# Patient Record
Sex: MALE | Race: WHITE | Employment: OTHER | URBAN - METROPOLITAN AREA
[De-identification: names, ages, dates, MRNs, and addresses within clinical notes are randomized per-mention and may not be internally consistent; named-entity substitution may affect disease eponyms.]

---

## 2017-01-09 ENCOUNTER — ANTI-COAG VISIT (OUTPATIENT)
Dept: PHARMACY | Facility: CLINIC | Age: 82
End: 2017-01-09

## 2017-01-09 DIAGNOSIS — I48.91 ATRIAL FIBRILLATION, UNSPECIFIED TYPE (HCC): ICD-10-CM

## 2017-01-09 LAB — INR BLD: 2.7

## 2017-02-06 ENCOUNTER — ANTI-COAG VISIT (OUTPATIENT)
Dept: PHARMACY | Facility: CLINIC | Age: 82
End: 2017-02-06

## 2017-02-06 DIAGNOSIS — I48.91 ATRIAL FIBRILLATION, UNSPECIFIED TYPE (HCC): ICD-10-CM

## 2017-02-06 LAB — INR BLD: 2.5

## 2017-03-06 ENCOUNTER — ANTI-COAG VISIT (OUTPATIENT)
Dept: PHARMACY | Facility: CLINIC | Age: 82
End: 2017-03-06

## 2017-03-06 DIAGNOSIS — I48.91 ATRIAL FIBRILLATION, UNSPECIFIED TYPE (HCC): ICD-10-CM

## 2017-03-06 LAB — INR BLD: 1.8

## 2017-04-03 ENCOUNTER — ANTI-COAG VISIT (OUTPATIENT)
Dept: PHARMACY | Facility: CLINIC | Age: 82
End: 2017-04-03

## 2017-04-03 DIAGNOSIS — I48.91 ATRIAL FIBRILLATION, UNSPECIFIED TYPE (HCC): ICD-10-CM

## 2017-04-03 LAB — INR BLD: 2.9

## 2017-05-01 ENCOUNTER — ANTI-COAG VISIT (OUTPATIENT)
Dept: PHARMACY | Facility: CLINIC | Age: 82
End: 2017-05-01

## 2017-05-01 DIAGNOSIS — I48.91 ATRIAL FIBRILLATION, UNSPECIFIED TYPE (HCC): ICD-10-CM

## 2017-05-01 LAB — INR BLD: 3.3

## 2017-05-22 ENCOUNTER — ANTI-COAG VISIT (OUTPATIENT)
Dept: PHARMACY | Facility: CLINIC | Age: 82
End: 2017-05-22

## 2017-05-22 DIAGNOSIS — I48.91 ATRIAL FIBRILLATION, UNSPECIFIED TYPE (HCC): ICD-10-CM

## 2017-05-22 LAB — INR BLD: 3

## 2017-06-19 ENCOUNTER — ANTI-COAG VISIT (OUTPATIENT)
Dept: PHARMACY | Facility: CLINIC | Age: 82
End: 2017-06-19

## 2017-06-19 DIAGNOSIS — I48.91 ATRIAL FIBRILLATION, UNSPECIFIED TYPE (HCC): ICD-10-CM

## 2017-06-19 LAB — INR BLD: 3

## 2017-07-17 ENCOUNTER — ANTI-COAG VISIT (OUTPATIENT)
Dept: PHARMACY | Facility: CLINIC | Age: 82
End: 2017-07-17

## 2017-07-17 DIAGNOSIS — I48.91 ATRIAL FIBRILLATION, UNSPECIFIED TYPE (HCC): ICD-10-CM

## 2017-07-17 LAB — INR BLD: 2.3

## 2017-08-14 ENCOUNTER — ANTI-COAG VISIT (OUTPATIENT)
Dept: PHARMACY | Facility: CLINIC | Age: 82
End: 2017-08-14

## 2017-08-14 DIAGNOSIS — I48.91 ATRIAL FIBRILLATION, UNSPECIFIED TYPE (HCC): ICD-10-CM

## 2017-08-14 LAB — INR BLD: 3.1

## 2017-09-18 ENCOUNTER — ANTI-COAG VISIT (OUTPATIENT)
Dept: PHARMACY | Facility: CLINIC | Age: 82
End: 2017-09-18

## 2017-09-18 DIAGNOSIS — I48.91 ATRIAL FIBRILLATION, UNSPECIFIED TYPE (HCC): ICD-10-CM

## 2017-09-18 LAB — INR BLD: 1.2

## 2017-10-02 ENCOUNTER — ANTI-COAG VISIT (OUTPATIENT)
Dept: PHARMACY | Facility: CLINIC | Age: 82
End: 2017-10-02

## 2017-10-02 DIAGNOSIS — I48.91 ATRIAL FIBRILLATION, UNSPECIFIED TYPE (HCC): ICD-10-CM

## 2017-10-02 LAB — INR BLD: 3.1

## 2017-10-02 NOTE — MR AVS SNAPSHOT
After Visit Summary             Dane Kelly   10/2/2017 10:30 AM   Anti-coag visit    Description:  Male : 1935   Provider:  [facility]   Department:  [facility]ICIANS [facility]              Your Follow-Up and Future Appointments         Below is a list of your follow-up and future appointments. This may not be a complete list as you may have made appointments directly with providers that we are not aware of or your providers may have made some for you. Please call your providers to confirm appointments. It is important to keep your appointments. Please bring your current insurance card, photo ID, co-pay, and all medication bottles to your appointment. If self-pay, payment is expected at the time of service. Your To-Do List     Future Appointments Provider Department Dept Phone    10/30/2017 10:00 AM [address] [place] [address] [facility] [phone]         Information from Your Visit        Department     Name Address Phone Fax    [facility] [address] [phone]      Anticoagulation Summary as of 10/2/2017              Today's INR 3. 1! Next INR check 10/30/2017      Description             Then Continue Warfarin 5 mg (one tablet) daily EXCEPT 7.5mg (one and a half tablets) every Monday. INR goal is 2-3    Call with medications changes, especially antibiotics and steroids and including any over-the-counter medications or herbal products. Call if you stop any medications.         Vital Signs     Smoking Status                   Current Every Day Smoker              Medications and Orders      Your Current Medications Are              warfarin (COUMADIN) 5 MG tablet Take 5 mg by mouth daily EXCEPT 7.5mg every Monday or as directed by [facility] Coumadin [facility] [phone] acetaminophen 650 MG TABS Take 650 mg by mouth every 4 hours as needed    metoprolol (LOPRESSOR) 50 MG tablet Take 0.5 tablets by mouth 2 times daily    lisinopril-hydrochlorothiazide (PRINZIDE;ZESTORETIC) 20-25 MG per tablet Take 1 tablet by mouth daily. fenofibrate micronized (LOFIBRA) 134 MG capsule Take 134 mg by mouth nightly. terazosin (HYTRIN) 5 MG capsule Take 5 mg by mouth nightly. pravastatin (PRAVACHOL) 40 MG tablet Take 40 mg by mouth nightly. vitamin B-12 (CYANOCOBALAMIN) 100 MCG tablet Take 100 mcg by mouth 2 times daily. Allergies              Pcn [Penicillins]       We Ordered/Performed the following           Protime-INR     Comments: This external order was created through the results console. Result Summary for Protime-INR      Result Information     Status          Final result (Resulted: 10/2/2017 10:36 AM)           10/2/2017 10:36 AM      Component Results     Component Value    INR 3.1               Additional Information        Basic Information     Date Of Birth Sex Race Ethnicity Preferred Language    1935 Male White Non-/Non  English      Problem List as of 10/2/2017                 Acute renal failure (ARF) (HCC)    Abdominal pain    Cholelithiasis    UTI (urinary tract infection)      Immunizations as of 10/2/2017     Name Date    Influenza Virus Vaccine  Deferred (Patient Refused)      Preventive Care        Date Due    Tetanus Combination Vaccine (1 - Tdap) 8/19/1955    Zoster Vaccine 8/19/1996    Pneumococcal Vaccines (two) for all adults aged 72 and over (1 of 2 - PCV13) 8/19/2001    Yearly Flu Vaccine (1) 9/1/2017            DotAlign Signup           DotAlign allows you to send messages to your doctor, view your test results, renew your prescriptions, schedule appointments, view visit notes, and more. How Do I Sign Up? 1. In your Internet browser, go to [email or web address] 2. Click on the Sign Up Now link in the Sign In box. You will see the New Member Sign Up page. 3. Enter your EARTHTORY Access Code exactly as it appears below. You will not need to use this code after youve completed the sign-up process. If you do not sign up before the expiration date, you must request a new code. EARTHTORY Access Code: [email or web address]  Expires: 10/13/2017  9:42 AM    4. Enter your Social Security Number ([id number]) and Date of Birth (mm/dd/yyyy) as indicated and click Submit. You will be taken to the next sign-up page. 5. Create a EARTHTORY ID. This will be your EARTHTORY login ID and cannot be changed, so think of one that is secure and easy to remember. 6. Create a EARTHTORY password. You can change your password at any time. 7. Enter your Password Reset Question and Answer. This can be used at a later time if you forget your password. 8. Enter your e-mail address. You will receive e-mail notification when new information is available in [address] 9. Click Sign Up. You can now view your medical record. Additional Information  If you have questions, please contact the physician practice where you receive care. Remember, EARTHTORY is NOT to be used for urgent needs. For medical emergencies, dial 911. For questions regarding your EARTHTORY account call [phone]. If you have a clinical question, please call your doctor's office.         October 2017 Details    Sun Mon Tue Wed Thu Fri Sat     1               2      7.5 mg   See details      3      5 mg         4      5 mg         5      5 mg         6      5 mg         7      5 mg           8      5 mg         9      7.5 mg         10      5 mg         11      5 mg         12      5 mg         13      5 mg         14      5 mg           15      5 mg         16      7.5 mg         17      5 mg         18      5 mg         19      5 mg         20      5 mg         21      5 mg           22      5 mg         23      7.5 mg         24      5 mg 25      5 mg         26      5 mg         27      5 mg         28      5 mg           29      5 mg         30      7.5 mg         31                    Date Details   10/02 This INR check       Date of next INR:  10/30/2017         How to take your warfarin dose              To take:  5 mg Take 1 of the 5 mg tablets. To take:  7.5 mg Take 1.5 of the 5 mg tablets.

## 2017-10-30 ENCOUNTER — ANTI-COAG VISIT (OUTPATIENT)
Dept: PHARMACY | Facility: CLINIC | Age: 82
End: 2017-10-30

## 2017-10-30 DIAGNOSIS — I48.91 ATRIAL FIBRILLATION, UNSPECIFIED TYPE (HCC): ICD-10-CM

## 2017-10-30 LAB — INR BLD: 3.4

## 2017-10-30 NOTE — PROGRESS NOTES
Mr. Bel Rosenbaum is a 81 y.o.  male with history of Afib who presents today for anticoagulation monitoring and adjustment. Patient verifies current dosing regimen. Patient denies s/s bleeding/bruising/swelling/SOB. No blood in urine or stool. No dietary changes. No changes in medication/OTC agents/Herbals. No change in alcohol use. No missed doses. No Procedures scheduled in the future at this time. Lab Results   Component Value Date    INR 3.4 10/30/2017    INR 3.1 10/02/2017    INR 1.2 09/18/2017           Coumadin dose: Hold warfarin today then  NEW DOSE: Warfarin 5 mg (one tablet) daily . Daniel Marin Recheck INR in 4 weeks. Patient reminded to call the Anticoagulation Clinic with any signs or symptoms of bleeding or with any medication changes. Patient given instructions utilizing the teach back method. After visit summary printed and reviewed with patient.       Medications reviewed and updated on home medication list Yes  Warfarin dose updated on patient home medication list  Yes    Influenza vaccine:   [] given    [] declined   [] received previously   [] plans to receive at a later time   [x] refused    [] documented in EPIC

## 2017-11-01 ENCOUNTER — HOSPITAL ENCOUNTER (OUTPATIENT)
Dept: OTHER | Age: 82
Discharge: HOME | End: 2017-11-30
Attending: INTERNAL MEDICINE | Admitting: INTERNAL MEDICINE

## 2017-12-01 ENCOUNTER — HOSPITAL ENCOUNTER (OUTPATIENT)
Dept: OTHER | Age: 82
Discharge: HOME | End: 2017-12-31
Attending: INTERNAL MEDICINE | Admitting: INTERNAL MEDICINE

## 2017-12-04 ENCOUNTER — ANTI-COAG VISIT (OUTPATIENT)
Dept: PHARMACY | Facility: CLINIC | Age: 82
End: 2017-12-04

## 2017-12-04 DIAGNOSIS — I48.91 ATRIAL FIBRILLATION, UNSPECIFIED TYPE (HCC): ICD-10-CM

## 2017-12-04 LAB — INTERNATIONAL NORMALIZATION RATIO, POC: 2

## 2017-12-04 NOTE — PROGRESS NOTES
Mr. Nikko Copeland is a 81 y.o.  male with history of Afib who presents today for anticoagulation monitoring and adjustment. Patient verifies current dosing regimen. Patient denies s/s bleeding/bruising/swelling/SOB. No blood in urine or stool. No dietary changes. No changes in medication/OTC agents/Herbals. No change in alcohol use. No missed doses. No Procedures scheduled in the future at this time. Lab Results   Component Value Date    INR 2 12/04/2017    INR 3.4 10/30/2017    INR 3.1 10/02/2017           Coumadin dose: Continue  Warfarin 5 mg (one tablet) daily . Vania Neil Recheck INR in 4 weeks. Patient reminded to call the Anticoagulation Clinic with any signs or symptoms of bleeding or with any medication changes. Patient given instructions utilizing the teach back method. After visit summary printed and reviewed with patient.       Medications reviewed and updated on home medication list Yes  Warfarin dose updated on patient home medication list  Yes    Influenza vaccine:   [] given    [] declined   [] received previously   [] plans to receive at a later time   [] refused    [] documented in EPIC

## 2018-01-01 ENCOUNTER — HOSPITAL ENCOUNTER (OUTPATIENT)
Dept: OTHER | Age: 83
Discharge: HOME | End: 2018-01-31
Attending: INTERNAL MEDICINE | Admitting: INTERNAL MEDICINE

## 2018-01-08 ENCOUNTER — ANTI-COAG VISIT (OUTPATIENT)
Dept: PHARMACY | Facility: CLINIC | Age: 83
End: 2018-01-08

## 2018-01-08 DIAGNOSIS — I48.91 ATRIAL FIBRILLATION, UNSPECIFIED TYPE (HCC): ICD-10-CM

## 2018-01-08 LAB — INTERNATIONAL NORMALIZATION RATIO, POC: 3

## 2018-01-08 NOTE — PROGRESS NOTES
Mr. Tasha Peters is a 81 y.o.  male with history of Afib who presents today for anticoagulation monitoring and adjustment. Patient verifies current dosing regimen  Patient denies s/s bleeding/bruising/swelling/SOB  No blood in urine or stool. No dietary changes. No changes in medication/OTC agents/Herbals. No change in alcohol use. No missed doses. No Procedures scheduled in the future at this time. Lab Results   Component Value Date    INR 3.0 01/08/2018    INR 2 12/04/2017    INR 3.4 10/30/2017       Patient was still taking 7.5mg on Mondays over the past four weeks instead of doing just 5mg daily. Since his INR has been historically high on that dose and it's on the higher end of his goal range of 2.0-3.0 today, instructed patient to just do 5mg daily. Coumadin dose: Continue Warfarin 5 mg (one tablet) daily. Will return in 4 weeks. After visit summary printed and reviewed with patient.       Medications reviewed and updated on home medication list: No: patient stated no changes  Warfarin dose updated on patient home medication list: Yes     Influenza vaccine:    [] given    [x] declined   [] received previously   [] plans to receive at a later time   [x] refused     [] documented in EPIC

## 2018-02-01 ENCOUNTER — HOSPITAL ENCOUNTER (OUTPATIENT)
Dept: OTHER | Age: 83
Discharge: HOME | End: 2018-02-28
Attending: INTERNAL MEDICINE | Admitting: INTERNAL MEDICINE

## 2018-02-05 ENCOUNTER — ANTI-COAG VISIT (OUTPATIENT)
Dept: PHARMACY | Facility: CLINIC | Age: 83
End: 2018-02-05

## 2018-02-05 DIAGNOSIS — I48.91 ATRIAL FIBRILLATION, UNSPECIFIED TYPE (HCC): ICD-10-CM

## 2018-02-05 LAB — INTERNATIONAL NORMALIZATION RATIO, POC: 3.3

## 2018-03-01 ENCOUNTER — HOSPITAL ENCOUNTER (OUTPATIENT)
Dept: OTHER | Age: 83
Discharge: HOME | End: 2018-03-31
Attending: INTERNAL MEDICINE | Admitting: INTERNAL MEDICINE

## 2018-03-06 ENCOUNTER — ANTI-COAG VISIT (OUTPATIENT)
Dept: PHARMACY | Facility: CLINIC | Age: 83
End: 2018-03-06

## 2018-03-06 DIAGNOSIS — I48.91 ATRIAL FIBRILLATION, UNSPECIFIED TYPE (HCC): ICD-10-CM

## 2018-03-06 LAB — INTERNATIONAL NORMALIZATION RATIO, POC: 1.9

## 2018-04-01 ENCOUNTER — HOSPITAL ENCOUNTER (OUTPATIENT)
Dept: OTHER | Age: 83
Discharge: HOME | End: 2018-04-30
Attending: INTERNAL MEDICINE | Admitting: INTERNAL MEDICINE

## 2018-04-11 ENCOUNTER — ANTI-COAG VISIT (OUTPATIENT)
Dept: PHARMACY | Facility: CLINIC | Age: 83
End: 2018-04-11

## 2018-04-11 LAB — INTERNATIONAL NORMALIZATION RATIO, POC: 3.3

## 2018-05-01 ENCOUNTER — HOSPITAL ENCOUNTER (OUTPATIENT)
Dept: OTHER | Age: 83
Discharge: HOME | End: 2018-05-31
Attending: INTERNAL MEDICINE | Admitting: INTERNAL MEDICINE

## 2018-05-07 ENCOUNTER — ANTI-COAG VISIT (OUTPATIENT)
Dept: PHARMACY | Facility: CLINIC | Age: 83
End: 2018-05-07

## 2018-05-07 LAB — INTERNATIONAL NORMALIZATION RATIO, POC: 3.2

## 2018-05-21 ENCOUNTER — ANTI-COAG VISIT (OUTPATIENT)
Dept: PHARMACY | Facility: CLINIC | Age: 83
End: 2018-05-21

## 2018-05-21 DIAGNOSIS — I48.91 ATRIAL FIBRILLATION, UNSPECIFIED TYPE (HCC): ICD-10-CM

## 2018-05-21 LAB — INR BLD: 2.1

## 2018-06-01 ENCOUNTER — HOSPITAL ENCOUNTER (OUTPATIENT)
Dept: OTHER | Age: 83
Discharge: HOME | End: 2018-06-30
Attending: INTERNAL MEDICINE | Admitting: INTERNAL MEDICINE

## 2018-07-01 ENCOUNTER — HOSPITAL ENCOUNTER (OUTPATIENT)
Dept: OTHER | Age: 83
Discharge: HOME | End: 2018-07-31
Attending: INTERNAL MEDICINE | Admitting: INTERNAL MEDICINE

## 2018-07-20 ENCOUNTER — TELEPHONE (OUTPATIENT)
Dept: PHARMACY | Facility: CLINIC | Age: 83
End: 2018-07-20

## 2018-07-20 ENCOUNTER — ANTI-COAG VISIT (OUTPATIENT)
Dept: PHARMACY | Facility: CLINIC | Age: 83
End: 2018-07-20

## 2018-07-20 DIAGNOSIS — I48.91 ATRIAL FIBRILLATION, UNSPECIFIED TYPE (HCC): ICD-10-CM

## 2018-07-20 LAB — INR BLD: 2.6

## 2018-08-01 ENCOUNTER — HOSPITAL ENCOUNTER (OUTPATIENT)
Dept: OTHER | Age: 83
Discharge: HOME | End: 2018-08-31
Attending: INTERNAL MEDICINE | Admitting: INTERNAL MEDICINE

## 2018-08-20 ENCOUNTER — ANTI-COAG VISIT (OUTPATIENT)
Dept: PHARMACY | Facility: CLINIC | Age: 83
End: 2018-08-20

## 2018-08-20 DIAGNOSIS — I48.91 ATRIAL FIBRILLATION, UNSPECIFIED TYPE (HCC): ICD-10-CM

## 2018-08-20 LAB — INR BLD: 4.7

## 2018-08-20 NOTE — PROGRESS NOTES
Mr. Yury Gonzalez is a 81 y.o.  male with history of Afib who presents today for anticoagulation monitoring and adjustment. Patient verifies current dosing regimen  Patient denies s/s bleeding/bruising/swelling/SOB  No blood in urine or stool. No changes in medication/OTC agents/Herbals. No change in alcohol use. No missed doses. No Procedures scheduled in the future at this time. Lab Results   Component Value Date    INR 4.70 08/20/2018    INR 2.60 07/20/2018    INR 2.10 05/21/2018       Pertinent findings: Patient appears well. No changes in medications. States that he's been drinking cranberry juice and grapefruit juice for the past week, which is most likely contributing to the supratherapeutic INR. He agreed to start avoiding these juices from now on. Will continue current dose and see him in 2 weeks to see if INR has adjusted back down to therapeutic range. Warfarin dosing: HOLD warfarin today and tomorrow  Then continue warfarin 5 mg daily except 2.5 mg on Mondays    After visit summary printed and reviewed with patient.

## 2018-09-01 ENCOUNTER — HOSPITAL ENCOUNTER (OUTPATIENT)
Dept: OTHER | Age: 83
Discharge: HOME | End: 2018-09-01
Attending: INTERNAL MEDICINE | Admitting: INTERNAL MEDICINE

## 2018-09-04 ENCOUNTER — ANTI-COAG VISIT (OUTPATIENT)
Dept: PHARMACY | Facility: CLINIC | Age: 83
End: 2018-09-04

## 2018-09-04 DIAGNOSIS — I48.91 ATRIAL FIBRILLATION, UNSPECIFIED TYPE (HCC): ICD-10-CM

## 2018-09-04 LAB — INTERNATIONAL NORMALIZATION RATIO, POC: 3.4

## 2018-09-17 ENCOUNTER — ANTI-COAG VISIT (OUTPATIENT)
Dept: PHARMACY | Facility: CLINIC | Age: 83
End: 2018-09-17

## 2018-09-17 DIAGNOSIS — I48.91 ATRIAL FIBRILLATION, UNSPECIFIED TYPE (HCC): ICD-10-CM

## 2018-09-17 LAB — INTERNATIONAL NORMALIZATION RATIO, POC: 2.1

## 2018-09-17 NOTE — PROGRESS NOTES
Mr. Ryan Du is a 81 y.o.  male with history of Afib who presents today for anticoagulation monitoring and adjustment. Patient verifies current dosing regimen. Lab Results   Component Value Date    INR 2.1 09/17/2018    INR 3.4 09/04/2018    INR 4.70 08/20/2018     Patient doing well. No problems or changes. Reduced his dose. INR now in goal range    Coumadin dose: continue same dose. Recheck INR in 3 weeks to verify INR stays in range    Patient reminded to call the Anticoagulation Clinic with any signs or symptoms of bleeding or with any medication changes. Patient given instructions utilizing the teach back method. After visit summary printed and reviewed with patient.     Medications reviewed and Warfarin dose updated

## 2018-10-08 ENCOUNTER — ANTI-COAG VISIT (OUTPATIENT)
Dept: PHARMACY | Age: 83
End: 2018-10-08
Payer: MEDICARE

## 2018-10-08 DIAGNOSIS — I48.91 ATRIAL FIBRILLATION, UNSPECIFIED TYPE (HCC): ICD-10-CM

## 2018-10-08 LAB — INTERNATIONAL NORMALIZATION RATIO, POC: 2.6

## 2018-10-08 PROCEDURE — 99211 OFF/OP EST MAY X REQ PHY/QHP: CPT

## 2018-10-08 PROCEDURE — 85610 PROTHROMBIN TIME: CPT

## 2018-11-08 ENCOUNTER — ANTI-COAG VISIT (OUTPATIENT)
Dept: PHARMACY | Age: 83
End: 2018-11-08
Payer: MEDICARE

## 2018-11-08 DIAGNOSIS — I48.0 PAROXYSMAL ATRIAL FIBRILLATION (HCC): ICD-10-CM

## 2018-11-08 LAB — INTERNATIONAL NORMALIZATION RATIO, POC: 3.3

## 2018-11-08 PROCEDURE — 85610 PROTHROMBIN TIME: CPT

## 2018-11-08 PROCEDURE — 99211 OFF/OP EST MAY X REQ PHY/QHP: CPT

## 2018-12-03 ENCOUNTER — ANTI-COAG VISIT (OUTPATIENT)
Dept: PHARMACY | Age: 83
End: 2018-12-03
Payer: MEDICARE

## 2018-12-03 LAB — INTERNATIONAL NORMALIZATION RATIO, POC: 2.5

## 2018-12-03 PROCEDURE — 99211 OFF/OP EST MAY X REQ PHY/QHP: CPT

## 2018-12-03 PROCEDURE — 85610 PROTHROMBIN TIME: CPT

## 2018-12-03 NOTE — PROGRESS NOTES
Mr. Silvio Bowens is a 81 y.o.  male with history of Afib who presents today for anticoagulation monitoring and adjustment. Patient verifies current dosing regimen  Patient denies s/s bleeding/bruising/swelling/SOB  No blood in urine or stool. No dietary changes. No changes in medication/OTC agents/Herbals. No change in alcohol use. No missed doses. No Procedures scheduled in the future at this time. Lab Results   Component Value Date    INR 2.5 12/03/2018    INR 3.3 11/08/2018    INR 2.6 10/08/2018       Pertinent findings:  Patient states doing well. No complaints at this time. Patient had a large salad yesterday with about 1/2 cup of broccoli in it. No change to warfarin weekly dosing of 5 mg daily except 2.5 mg on Mondays and fridays. Next INR in two weeks. After visit summary printed and reviewed with patient.       Medications reviewed and updated on home medication list: NO     Warfarin dose updated on patient home medication list: NO

## 2018-12-31 ENCOUNTER — ANTI-COAG VISIT (OUTPATIENT)
Dept: PHARMACY | Age: 83
End: 2018-12-31
Payer: MEDICARE

## 2018-12-31 LAB — INR BLD: 3.4

## 2018-12-31 PROCEDURE — 85610 PROTHROMBIN TIME: CPT

## 2018-12-31 PROCEDURE — 99211 OFF/OP EST MAY X REQ PHY/QHP: CPT

## 2019-01-28 ENCOUNTER — ANTI-COAG VISIT (OUTPATIENT)
Dept: PHARMACY | Age: 84
End: 2019-01-28
Payer: MEDICARE

## 2019-01-28 LAB — INTERNATIONAL NORMALIZATION RATIO, POC: 3.3

## 2019-01-28 PROCEDURE — 99211 OFF/OP EST MAY X REQ PHY/QHP: CPT

## 2019-01-28 PROCEDURE — 85610 PROTHROMBIN TIME: CPT

## 2019-02-18 ENCOUNTER — ANTI-COAG VISIT (OUTPATIENT)
Dept: PHARMACY | Age: 84
End: 2019-02-18
Payer: MEDICARE

## 2019-02-18 LAB — INTERNATIONAL NORMALIZATION RATIO, POC: 2.2

## 2019-02-18 PROCEDURE — 99211 OFF/OP EST MAY X REQ PHY/QHP: CPT

## 2019-02-18 PROCEDURE — 85610 PROTHROMBIN TIME: CPT

## 2019-03-18 ENCOUNTER — ANTI-COAG VISIT (OUTPATIENT)
Dept: PHARMACY | Age: 84
End: 2019-03-18
Payer: MEDICARE

## 2019-03-18 DIAGNOSIS — I48.91 ATRIAL FIBRILLATION, UNSPECIFIED TYPE (HCC): ICD-10-CM

## 2019-03-18 LAB — INR BLD: 2.4

## 2019-03-18 PROCEDURE — 99211 OFF/OP EST MAY X REQ PHY/QHP: CPT

## 2019-03-18 PROCEDURE — 85610 PROTHROMBIN TIME: CPT

## 2019-04-15 ENCOUNTER — ANTI-COAG VISIT (OUTPATIENT)
Dept: PHARMACY | Age: 84
End: 2019-04-15
Payer: MEDICARE

## 2019-04-15 DIAGNOSIS — I48.91 ATRIAL FIBRILLATION, UNSPECIFIED TYPE (HCC): ICD-10-CM

## 2019-04-15 LAB — INTERNATIONAL NORMALIZATION RATIO, POC: 1.2

## 2019-04-15 PROCEDURE — 85610 PROTHROMBIN TIME: CPT

## 2019-04-15 PROCEDURE — 99211 OFF/OP EST MAY X REQ PHY/QHP: CPT

## 2019-04-15 NOTE — PROGRESS NOTES
Mr. Faith Woods is a 81 y.o.  male with history of Afib who presents today for anticoagulation monitoring and adjustment. Patient verifies current dosing regimen. Lab Results   Component Value Date    INR 1.2 04/15/2019    INR 2.4 03/18/2019    INR 2.2 02/18/2019     Patient doing well. Reports he has been drinking a lot of V8 juice recently the past 1-2 weeks. About 4 12 ounce bottles per day. States he really likes it and thought it was good for him. Counseled on vitamin K content that reverses the effect of warfarin. He would like to keep drinking it - asked him to cut back to 2, maybe 3 on occasion bottles per day and he is agreeable. Will have to increase dose to account for this increase in vitamin K     Counseled on need to be consistent. Coumadin dose: 7.5 mg x 1 then 5 mg daily except 2.5 mg every Friday (~18% increase). Recheck INR in 1 week(s). Patient reminded to call the Anticoagulation Clinic with any signs or symptoms of bleeding or with any medication changes. Patient given instructions utilizing the teach back method. After visit summary printed and reviewed with patient.     Medications reviewed and Warfarin dose updated

## 2019-04-22 ENCOUNTER — ANTI-COAG VISIT (OUTPATIENT)
Dept: PHARMACY | Age: 84
End: 2019-04-22
Payer: MEDICARE

## 2019-04-22 DIAGNOSIS — I48.91 ATRIAL FIBRILLATION, UNSPECIFIED TYPE (HCC): ICD-10-CM

## 2019-04-22 LAB — INTERNATIONAL NORMALIZATION RATIO, POC: 1.8

## 2019-04-22 PROCEDURE — 99211 OFF/OP EST MAY X REQ PHY/QHP: CPT

## 2019-04-22 PROCEDURE — 85610 PROTHROMBIN TIME: CPT

## 2019-04-22 NOTE — PROGRESS NOTES
Mr. Elma Aranda is a 81 y.o.  male with history of Afib who presents today for anticoagulation monitoring and adjustment. Patient verifies current dosing regimen  Patient denies s/s bleeding/bruising/swelling/SOB  No blood in urine or stool. No dietary changes. No changes in medication/OTC agents/Herbals. No change in alcohol use. No missed doses. No Procedures scheduled in the future at this time. Lab Results   Component Value Date    INR 1.8 04/22/2019    INR 1.2 04/15/2019    INR 2.4 03/18/2019       Pertinent findings: not drinking as much V8    Warfarin dosing: Continue  Warfarin 5 mg daily except 2.5 mg every Friday. INR goal is 2-3  See in 4 weeks    After visit summary printed and reviewed with patient.       Medications reviewed and updated on home medication list: Yes  Warfarin dose updated on patient home medication list: Yes

## 2019-05-20 ENCOUNTER — ANTI-COAG VISIT (OUTPATIENT)
Dept: PHARMACY | Age: 84
End: 2019-05-20
Payer: MEDICARE

## 2019-05-20 DIAGNOSIS — I48.91 ATRIAL FIBRILLATION, UNSPECIFIED TYPE (HCC): ICD-10-CM

## 2019-05-20 LAB — INTERNATIONAL NORMALIZATION RATIO, POC: 1.7

## 2019-05-20 PROCEDURE — 85610 PROTHROMBIN TIME: CPT

## 2019-05-20 PROCEDURE — 99211 OFF/OP EST MAY X REQ PHY/QHP: CPT

## 2019-05-20 NOTE — PROGRESS NOTES
Mr. Shari Valdez is a 81 y.o.  male with history of Afib who presents today for anticoagulation monitoring and adjustment. Patient verifies current dosing regimen  Patient denies s/s bleeding/bruising/swelling/SOB  No blood in urine or stool. No dietary changes. No changes in medication/OTC agents/Herbals. No change in alcohol use. No missed doses. No Procedures scheduled in the future at this time. Lab Results   Component Value Date    INR 1.7 05/20/2019    INR 1.8 04/22/2019    INR 1.2 04/15/2019       Pertinent findings: none    Warfarin dosing: Take 7.5mg today  NEW DOSE  Warfarin 5 mg daily      After visit summary printed and reviewed with patient.       Medications reviewed and updated on home medication list: Yes  Warfarin dose updated on patient home medication list: Yes

## 2019-06-24 ENCOUNTER — ANTI-COAG VISIT (OUTPATIENT)
Dept: PHARMACY | Age: 84
End: 2019-06-24
Payer: MEDICARE

## 2019-06-24 DIAGNOSIS — I48.91 ATRIAL FIBRILLATION, UNSPECIFIED TYPE (HCC): ICD-10-CM

## 2019-06-24 LAB — INTERNATIONAL NORMALIZATION RATIO, POC: 2.8

## 2019-06-24 PROCEDURE — 85610 PROTHROMBIN TIME: CPT

## 2019-06-24 PROCEDURE — 99211 OFF/OP EST MAY X REQ PHY/QHP: CPT

## 2019-06-24 NOTE — PROGRESS NOTES
Mr. Shari Valdez is a 81 y.o.  male with history of Afib who presents today for anticoagulation monitoring and adjustment. Patient verifies current dosing regimen  Patient denies s/s bleeding/bruising/swelling/SOB  No blood in urine or stool. No dietary changes. No changes in medication/OTC agents/Herbals. No change in alcohol use. No missed doses. No Procedures scheduled in the future at this time. Lab Results   Component Value Date    INR 2.8 06/24/2019    INR 1.7 05/20/2019    INR 1.8 04/22/2019       Pertinent findings: Patient appears well, no changes    Warfarin dosing:   Continue  Warfarin 5 mg (1 tablet) daily    After visit summary printed and reviewed with patient.       Medications reviewed and updated on home medication list: Yes, patient denied any changes  Warfarin dose updated on patient home medication list: Yes

## 2019-07-23 ENCOUNTER — TELEPHONE (OUTPATIENT)
Dept: PHARMACY | Age: 84
End: 2019-07-23

## 2019-07-23 NOTE — TELEPHONE ENCOUNTER
I placed a call to patient to reschedule missed appointment. No answer, no VM, could not leave message.     Beverly Morales, [address] [place] [address]  Anticoagulation Service  [phone]

## 2019-07-24 ENCOUNTER — ANTI-COAG VISIT (OUTPATIENT)
Dept: PHARMACY | Age: 84
End: 2019-07-24
Payer: MEDICARE

## 2019-07-24 LAB — INTERNATIONAL NORMALIZATION RATIO, POC: 1.8

## 2019-07-24 PROCEDURE — 99211 OFF/OP EST MAY X REQ PHY/QHP: CPT

## 2019-07-24 PROCEDURE — 85610 PROTHROMBIN TIME: CPT

## 2019-08-07 ENCOUNTER — HOSPITAL ENCOUNTER (OUTPATIENT)
Dept: GENERAL RADIOLOGY | Age: 84
Discharge: HOME | End: 2019-08-07
Payer: MEDICARE

## 2019-08-07 DIAGNOSIS — M50.00 CERVICAL DISC DISEASE WITH MYELOPATHY: ICD-10-CM

## 2019-08-07 PROCEDURE — 77080 DXA BONE DENSITY AXIAL: CPT

## 2019-08-28 ENCOUNTER — TELEPHONE (OUTPATIENT)
Dept: PHARMACY | Age: 84
End: 2019-08-28